# Patient Record
Sex: MALE | Race: WHITE | NOT HISPANIC OR LATINO | Employment: FULL TIME | URBAN - METROPOLITAN AREA
[De-identification: names, ages, dates, MRNs, and addresses within clinical notes are randomized per-mention and may not be internally consistent; named-entity substitution may affect disease eponyms.]

---

## 2023-08-23 ENCOUNTER — OFFICE VISIT (OUTPATIENT)
Dept: URGENT CARE | Facility: URGENT CARE | Age: 23
End: 2023-08-23
Payer: COMMERCIAL

## 2023-08-23 VITALS
WEIGHT: 182 LBS | DIASTOLIC BLOOD PRESSURE: 81 MMHG | SYSTOLIC BLOOD PRESSURE: 129 MMHG | BODY MASS INDEX: 25.48 KG/M2 | OXYGEN SATURATION: 98 % | HEART RATE: 76 BPM | TEMPERATURE: 97.5 F | HEIGHT: 71 IN

## 2023-08-23 DIAGNOSIS — H60.392 INFECTIVE OTITIS EXTERNA, LEFT: Primary | ICD-10-CM

## 2023-08-23 DIAGNOSIS — H61.22 IMPACTED CERUMEN OF LEFT EAR: ICD-10-CM

## 2023-08-23 PROCEDURE — 99203 OFFICE O/P NEW LOW 30 MIN: CPT | Mod: 25 | Performed by: PHYSICIAN ASSISTANT

## 2023-08-23 PROCEDURE — 69210 REMOVE IMPACTED EAR WAX UNI: CPT | Mod: LT | Performed by: PHYSICIAN ASSISTANT

## 2023-08-23 RX ORDER — NEOMYCIN SULFATE, POLYMYXIN B SULFATE, HYDROCORTISONE 3.5; 10000; 1 MG/ML; [USP'U]/ML; MG/ML
3 SOLUTION/ DROPS AURICULAR (OTIC) 4 TIMES DAILY
Qty: 5 ML | Refills: 0 | Status: SHIPPED | OUTPATIENT
Start: 2023-08-23 | End: 2023-08-30

## 2023-08-23 NOTE — PROGRESS NOTES
"Chief Complaint   Patient presents with    Ear Problem     Ear pain and muffling (left) x Monday - went swimming on Sunday        ASSESSMENT/PLAN:  Jay was seen today for ear problem.    Diagnoses and all orders for this visit:    Infective otitis externa, left  -     neomycin-polymyxin-hydrocortisone (CORTISPORIN) 3.5-87261-3 otic solution; Place 3 drops in ear(s) 4 times daily for 7 days  -     REMOVE IMPACTED CERUMEN    Impacted cerumen of left ear    Patient with infective otitis externa and cerumen impaction.  Was attempted to be removed with instrumentation and ear lavage which was only partially successful.  Started on the drops above.After 2-3 days as pain improves use 5 drops of debrox in your ear 2 x a day for 3 days    Return to clinic if symptoms are not improving    Jermaine Vinson PA-C      SUBJECTIVE:  Jay is a 22 year old male who presents to urgent care with 3 days of pain and muffling of the left ear.  He was swimming the day before.  No fevers or chills.    ROS: Pertinent ROS neg other than the symptoms noted above in the HPI.     OBJECTIVE:  /81   Pulse 76   Temp 97.5  F (36.4  C) (Tympanic)   Ht 1.803 m (5' 11\")   Wt 82.6 kg (182 lb)   SpO2 98%   BMI 25.38 kg/m     GENERAL: healthy, alert and no distress  EYES: Eyes grossly normal to inspection, PERRL and conjunctivae and sclerae normal  HENT: Right tympanic membrane partially obstructed by cerumen, left tympanic cerumen impaction membrane obstructed by cerumen, left auditory canal erythematous and swollen    DIAGNOSTICS    No results found for any visits on 08/23/23.     No current outpatient medications on file.     No current facility-administered medications for this visit.      There is no problem list on file for this patient.     No past medical history on file.  No past surgical history on file.  No family history on file.  Social History     Tobacco Use    Smoking status: Never    Smokeless tobacco: Never   Substance " Use Topics    Alcohol use: Yes              The plan of care was discussed with the patient. They understand and agree with the course of treatment prescribed. A printed summary was given including instructions and medications.  The use of Dragon/[employer] dictation services may have been used to construct the content in this note; any grammatical or spelling errors are non-intentional. Please contact the author of this note directly if you are in need of any clarification.

## 2025-07-07 ENCOUNTER — OFFICE VISIT (OUTPATIENT)
Dept: URGENT CARE | Facility: URGENT CARE | Age: 25
End: 2025-07-07
Payer: COMMERCIAL

## 2025-07-07 VITALS
WEIGHT: 183.8 LBS | DIASTOLIC BLOOD PRESSURE: 64 MMHG | SYSTOLIC BLOOD PRESSURE: 122 MMHG | BODY MASS INDEX: 25.63 KG/M2 | RESPIRATION RATE: 14 BRPM | OXYGEN SATURATION: 97 % | TEMPERATURE: 97.8 F | HEART RATE: 61 BPM

## 2025-07-07 DIAGNOSIS — H61.23 BILATERAL IMPACTED CERUMEN: Primary | ICD-10-CM

## 2025-07-07 PROCEDURE — 3078F DIAST BP <80 MM HG: CPT | Performed by: PHYSICIAN ASSISTANT

## 2025-07-07 PROCEDURE — 99213 OFFICE O/P EST LOW 20 MIN: CPT | Mod: 25 | Performed by: PHYSICIAN ASSISTANT

## 2025-07-07 PROCEDURE — 3074F SYST BP LT 130 MM HG: CPT | Performed by: PHYSICIAN ASSISTANT

## 2025-07-07 PROCEDURE — 69209 REMOVE IMPACTED EAR WAX UNI: CPT | Performed by: PHYSICIAN ASSISTANT

## 2025-07-07 RX ORDER — MAGNESIUM OXIDE 400 MG/1
400 TABLET ORAL
COMMUNITY
Start: 2024-12-18

## 2025-07-07 RX ORDER — AMPICILLIN TRIHYDRATE 500 MG
1000 CAPSULE ORAL
COMMUNITY
Start: 2024-12-18

## 2025-07-07 NOTE — PROGRESS NOTES
Urgent Care Clinic Visit    Chief Complaint   Patient presents with    Urgent Care    Ear Problem     Pt was up in the cabin this past week and got some water in his left ear. Having hard time hearing. Pt did put in debrox this morning.               7/7/2025     1:36 PM   Additional Questions   Roomed by Juan   Accompanied by self

## 2025-07-07 NOTE — PROGRESS NOTES
URGENT CARE VISIT:    SUBJECTIVE:   Jay Mars is a 24 year old male presenting with a chief complaint of left  decreased hearing.  Onset was 1 week(s) ago.   He denies the following symptoms: ear pain, fever, chills, and stuffy nose  Course of illness is same.    Treatment measures tried include Debrox with no relief of symptoms.  Predisposing factors include got water in his ear.    PMH: No past medical history on file.  Allergies: Patient has no known allergies.   Medications:   Current Outpatient Medications   Medication Sig Dispense Refill    Cholecalciferol (D 1000) 25 MCG (1000 UT) CAPS Take 1,000 Units by mouth.      magnesium oxide (MAG-OX) 400 MG tablet Take 400 mg by mouth.       Social History:   Social History     Tobacco Use    Smoking status: Never     Passive exposure: Never    Smokeless tobacco: Never   Substance Use Topics    Alcohol use: Yes       ROS:  Review of systems negative except as stated above.    OBJECTIVE:  /64   Pulse 61   Temp 97.8  F (36.6  C) (Tympanic)   Resp 14   Wt 83.4 kg (183 lb 12.8 oz)   SpO2 97%   BMI 25.63 kg/m    GENERAL APPEARANCE: healthy, alert and no distress  EYES: conjunctiva clear  HENT: TM's normal.  Copious cerumen present in bilateral external ear canal. Left more than right.  NECK: supple, nontender, no lymphadenopathy  RESP: lungs clear to auscultation - no rales, rhonchi or wheezes  CV: regular rates and rhythm, normal S1 S2, no murmur noted  SKIN: no suspicious lesions or rashes      ASSESSMENT:    ICD-10-CM    1. Bilateral impacted cerumen  H61.23 [place] REMOVAL IMPACTED CERUMEN IRRIGATION/LVG UNILAT          PROCEDURE: Ear irrigation  Ear wax was successfully removed with irrigation. Patient tolerated the procedure well.     PLAN:  Patient Instructions   Patient was educated on the natural  course of the condition. Conservative measures to prevent ear wax build up including applying a few drops of mineral oil or earwax softener (Debrox) were  discussed.  Avoid using q tips as this may push ear wax further in to the ear canal.  See your primary care provider if symptoms worsen or do not improve in 7 days. Seek emergency care if you develop severe ear pain or fever over 103.    Patient verbalized understanding and is agreeable to plan. The patient was discharged ambulatory and in stable condition.    Keisha Mason PA-C on 7/7/2025 at 2:59 PM